# Patient Record
Sex: MALE | Race: WHITE | Employment: FULL TIME | URBAN - METROPOLITAN AREA
[De-identification: names, ages, dates, MRNs, and addresses within clinical notes are randomized per-mention and may not be internally consistent; named-entity substitution may affect disease eponyms.]

---

## 2017-10-26 LAB
CREATININE, EXTERNAL: 1.08
LDL-C, EXTERNAL: 75

## 2018-03-20 ENCOUNTER — OFFICE VISIT (OUTPATIENT)
Dept: INTERNAL MEDICINE CLINIC | Age: 48
End: 2018-03-20

## 2018-03-20 VITALS
DIASTOLIC BLOOD PRESSURE: 88 MMHG | WEIGHT: 186 LBS | OXYGEN SATURATION: 96 % | SYSTOLIC BLOOD PRESSURE: 116 MMHG | HEIGHT: 68 IN | RESPIRATION RATE: 12 BRPM | HEART RATE: 62 BPM | TEMPERATURE: 97.6 F | BODY MASS INDEX: 28.19 KG/M2

## 2018-03-20 DIAGNOSIS — E66.3 OVERWEIGHT (BMI 25.0-29.9): ICD-10-CM

## 2018-03-20 DIAGNOSIS — F51.01 PRIMARY INSOMNIA: ICD-10-CM

## 2018-03-20 DIAGNOSIS — G89.29 HEEL PAIN, CHRONIC, LEFT: ICD-10-CM

## 2018-03-20 DIAGNOSIS — M79.672 HEEL PAIN, CHRONIC, LEFT: ICD-10-CM

## 2018-03-20 DIAGNOSIS — R03.0 ELEVATED BP WITHOUT DIAGNOSIS OF HYPERTENSION: Primary | ICD-10-CM

## 2018-03-20 PROBLEM — Z91.09 ENVIRONMENTAL ALLERGIES: Status: ACTIVE | Noted: 2018-03-20

## 2018-03-20 RX ORDER — ZOLPIDEM TARTRATE 10 MG/1
5 TABLET ORAL
COMMUNITY

## 2018-03-20 RX ORDER — BISMUTH SUBSALICYLATE 262 MG
1 TABLET,CHEWABLE ORAL DAILY
COMMUNITY

## 2018-03-20 RX ORDER — IBUPROFEN 200 MG
CAPSULE ORAL AS NEEDED
COMMUNITY
End: 2018-03-20

## 2018-03-20 NOTE — PROGRESS NOTES
Deondre Dunbar is a 52y.o. year old male who is a new patient to me today. He was previous followed by Dr He Shipman at [facility]. He works at [employer] for [place]. He RTC with wife. He brought in previous PCP records for review. Assessment & Plan:   Diagnoses and all orders for this visit:    1. Elevated BP without diagnosis of hypertension- new dx, reviewed possible etiologies of elevated BP, will continue to monitor at home and up date me in 6-8 wks. If remains borderline did review starting low dose medication    2. Primary insomnia- new dx to me, chronic problem, using meds infrequently, reviewed concerns about long term use,  not reviewed, no changes     3. Heel pain, chronic, left- this is a chronic problem but new to me, symptoms are: not changed, differential dx reviewed with the patient, sounds more like a heel issue (spur or stress fx) more then plantar fasciitis. Will start w/ xray. Did review seeing podiatry. Red flags were reviewed with the patient to RTC or notify me, expected time course for resolution reviewed. -     XR CALCANEUS LT; Future    4. Overweight (BMI 25.0-29. 9)- new dx to me, slightly up from his baseline per him, I have reviewed/discussed the above normal BMI with the patient. I have recommended the following interventions: lifestyle education regarding diet. Follow-up Disposition:  Return in about 6 months (around 9/20/2018) for FULL PHYSICAL - 30 minutes. Subjective:     Fide Baca was seen today for Establish Care    Neurological Review  He is here today to talk about headaches. This is a chronic problem. Course to date has been improving. Previously he attributed to using a Verve green discs. Now will get intermittent and attributed to diet. He reports when he did have h/a it was more global and throbbing. His BP had been on/off.     Orthopedic Review  He presents do to pain of his left foot that is secondary to no known injury and started at least 9 month.  Since it started his pain has not changed. He describes the pain as aching, sharp. It is constant but fluctuating in intensity. He denies weakness, denies numbness, denies paresthesias. Exacerbating factors identifiable by patient are walking, running. He has tried the following: insoles. These have been: somewhat effective. Previous workup: none. The following sections were reviewed & updated as appropriate: PMH, PL, PSH, FH, and SH. Patient Active Problem List   Diagnosis Code    Primary insomnia F51.01    Environmental allergies Z91.09          Prior to Admission medications    Medication Sig Start Date End Date Taking? Authorizing Provider   FLUTICASONE PROPIONATE (FLONASE NA) 2 Sprays by Nasal route daily as needed. Yes Historical Provider   zolpidem (AMBIEN) 10 mg tablet Take 5 mg by mouth nightly as needed for Sleep. Yes Historical Provider   multivitamin (ONE A DAY) tablet Take 1 Tab by mouth daily. Yes Historical Provider          No Known Allergies         Review of Systems   Constitutional: Negative for malaise/fatigue and weight loss. Respiratory: Negative for cough and shortness of breath. Cardiovascular: Negative for chest pain, palpitations and leg swelling. Gastrointestinal: Negative for abdominal pain, constipation, diarrhea, heartburn, nausea and vomiting. Genitourinary: Negative for frequency. Musculoskeletal: Positive for joint pain. Negative for myalgias. Skin: Negative for rash. Neurological: Positive for headaches. Negative for tingling, sensory change and focal weakness. Psychiatric/Behavioral: Negative for depression. The patient is not nervous/anxious and does not have insomnia. Objective:   Physical Exam   Constitutional: No distress. HENT:   Mouth/Throat: Mucous membranes are normal.   Eyes: Conjunctivae are normal. No scleral icterus. Neck: Neck supple. Cardiovascular: Regular rhythm and normal heart sounds.     No murmur heard. Pulmonary/Chest: Effort normal and breath sounds normal. He has no wheezes. He has no rales. Abdominal: Soft. Bowel sounds are normal. He exhibits no mass. There is no hepatosplenomegaly. There is no tenderness. Musculoskeletal: He exhibits no edema. Left foot: There is no tenderness, no bony tenderness, no deformity and no laceration. Lymphadenopathy:     He has no cervical adenopathy. Skin: No rash noted. Psychiatric: He has a normal mood and affect. His behavior is normal.          Visit Vitals    /88    Pulse 62    Temp 97.6 °F (36.4 °C) (Oral)    Resp 12    Ht 5' 8.14\" (1.731 m)    Wt 186 lb (84.4 kg)    SpO2 96%    BMI 28.17 kg/m2         Disclaimer:  Advised him to call back or return to office if symptoms worsen/change/persist.  Discussed expected course/resolution/complications of diagnosis in detail with patient. Medication risks/benefits/costs/interactions/alternatives discussed with patient. He was given an after visit summary which includes diagnoses, current medications, & vitals. He expressed understanding with the diagnosis and plan. Aspects of this note may have been generated using voice recognition software. Despite editing, there may be some syntax errors.        Piotr Spaulding MD

## 2018-03-20 NOTE — PROGRESS NOTES
To establish care. Last saw Dr. Jessica Lucas at [place] in October. BP was elevated at that time.   Had tetanus shot at [facility].

## 2018-03-20 NOTE — PATIENT INSTRUCTIONS
Checking your blood pressure at home: Your blood pressure was either borderline or to high. Please check your blood pressure 2-3 times per week. If the top number stays higher then 140 or the bottom number higher then 90 please let me know. Office number: [phone] or you can use [employer]. Learning About High Blood Pressure  What is high blood pressure? Blood pressure is a measure of how hard the blood pushes against the walls of your arteries. It's normal for blood pressure to go up and down throughout the day, but if it stays up, you have high blood pressure. Another name for high blood pressure is hypertension. Two numbers tell you your blood pressure. The first number is the systolic pressure. It shows how hard the blood pushes when your heart is pumping. The second number is the diastolic pressure. It shows how hard the blood pushes between heartbeats, when your heart is relaxed and filling with blood. A blood pressure of less than 120/80 (say \"120 over 80\") is ideal for an adult. High blood pressure is 140/90 or higher. You have high blood pressure if your top number is 140 or higher or your bottom number is 90 or higher, or both. Many people fall into the category in between, called prehypertension. People with prehypertension need to make lifestyle changes to bring their blood pressure down and help prevent or delay high blood pressure. What happens when you have high blood pressure? · Blood flows through your arteries with too much force. Over time, this damages the walls of your arteries. But you can't feel it. High blood pressure usually doesn't cause symptoms. · Fat and calcium start to build up in your arteries. This buildup is called plaque. Plaque makes your arteries narrower and stiffer. Blood can't flow through them as easily. · This lack of good blood flow starts to damage some of the organs in your body.  This can lead to problems such as coronary artery disease and heart attack, heart failure, stroke, kidney failure, and eye damage. How can you prevent high blood pressure? · Stay at a healthy weight. · Try to limit how much sodium you eat to less than 2,300 milligrams (mg) a day. If you limit your sodium to 1,500 mg a day, you can lower your blood pressure even more. ¨ Buy foods that are labeled \"unsalted,\" \"sodium-free,\" or \"low-sodium. \" Foods labeled \"reduced-sodium\" and \"light sodium\" may still have too much sodium. ¨ Flavor your food with garlic, lemon juice, onion, vinegar, herbs, and spices instead of salt. Do not use soy sauce, steak sauce, onion salt, garlic salt, mustard, or ketchup on your food. ¨ Use less salt (or none) when recipes call for it. You can often use half the salt a recipe calls for without losing flavor. · Be physically active. Get at least 30 minutes of exercise on most days of the week. Walking is a good choice. You also may want to do other activities, such as running, swimming, cycling, or playing tennis or team sports. · Limit alcohol to 2 drinks a day for men and 1 drink a day for women. · Eat plenty of fruits, vegetables, and low-fat dairy products. Eat less saturated and total fats. How is high blood pressure treated? · Your doctor will suggest making lifestyle changes. For example, your doctor may ask you to eat healthy foods, quit smoking, lose extra weight, and be more active. · If lifestyle changes don't help enough or your blood pressure is very high, you will have to take medicine every day. Follow-up care is a key part of your treatment and safety. Be sure to make and go to all appointments, and call your doctor if you are having problems. It's also a good idea to know your test results and keep a list of the medicines you take. Where can you learn more? Go to http://sigrid-cristin.info/. Enter P501 in the search box to learn more about \"Learning About High Blood Pressure. \"  Current as of: September 21, 2016  Content Version: 11.4  © 3478-6316 [employer]wise, Inc[employer]. Care instructions adapted under license by [employer] (which disclaims liability or warranty for this information). If you have questions about a medical condition or this instruction, always ask your healthcare professional. [address] any warranty or liability for your use of this information.

## 2018-03-21 ENCOUNTER — HOSPITAL ENCOUNTER (OUTPATIENT)
Dept: GENERAL RADIOLOGY | Age: 48
Discharge: HOME | End: 2018-03-21
Payer: COMMERCIAL

## 2018-03-21 DIAGNOSIS — G89.29 HEEL PAIN, CHRONIC, LEFT: ICD-10-CM

## 2018-03-21 DIAGNOSIS — M79.672 HEEL PAIN, CHRONIC, LEFT: ICD-10-CM

## 2018-03-21 PROCEDURE — 73650 X-RAY EXAM OF HEEL: CPT

## 2019-04-08 NOTE — MR AVS SNAPSHOT
[address] [address] 
[phone] Patient: Isis Schultz MRN: [id number] Visit Information Date & Time Provider Department Dept. Phone Encounter #  
 3/20/2018  8:30 AM Britt Yates, [address] [place] [address] Internal Medicine [phone] [id number] Follow-up Instructions Return in about 6 months (around 9/20/2018) for FULL PHYSICAL - 30 minutes. Upcoming Health Maintenance Date Due DTaP/Tdap/Td series (1 - Tdap) 11/15/1991 Allergies as of 3/20/2018  Review Complete On: 3/20/2018 By: Britt Yates MD  
 No Known Allergies Current Immunizations  Reviewed on 3/20/2018 No immunizations on file. Reviewed by Jaja Newby RN on 3/20/2018 at  8:44 AM  
You Were Diagnosed With   
  
 Codes Comments Elevated BP without diagnosis of hypertension    -  Primary ICD-10-CM: R03.0 ICD-9-CM: 796.2 Primary insomnia     ICD-10-CM: F51.01 
ICD-9-CM: 307.42 Heel pain, chronic, left     ICD-10-CM: M79.672, G89.29 ICD-9-CM: 729.5, 338.29 Vitals BP Pulse Temp Resp Height(growth percentile) Weight(growth percentile) 116/88 62 97.6 °F (36.4 °C) (Oral) 12 5' 8.14\" (1.731 m) 186 lb (84.4 kg) SpO2 BMI Smoking Status 96% 28.17 kg/m2 Never Smoker BMI and BSA Data Body Mass Index Body Surface Area  
 28.17 kg/m 2 2.01 m 2 Preferred Pharmacy Pharmacy Name Phone [address], [facility] [address] [phone] Your Updated Medication List  
  
   
This list is accurate as of 3/20/18  9:23 AM.  Always use your most recent med list.  
  
  
  
  
 AMBIEN 10 mg tablet Generic drug:  zolpidem Take 5 mg by mouth nightly as needed for Sleep. FLONASE NA  
2 Sprays by Nasal route daily as needed. multivitamin tablet Commonly known as:  ONE A DAY  
 Take 1 Tab by mouth daily. Follow-up Instructions Return in about 6 months (around 9/20/2018) for FULL PHYSICAL - 30 minutes. To-Do List   
 03/20/2018 Imaging:  XR CALCANEUS LT Patient Instructions Checking your blood pressure at home: Your blood pressure was either borderline or to high. Please check your blood pressure 2-3 times per week. If the top number stays higher then 140 or the bottom number higher then 90 please let me know. Office number: [phone] or you can use [employer]. Learning About High Blood Pressure What is high blood pressure? Blood pressure is a measure of how hard the blood pushes against the walls of your arteries. It's normal for blood pressure to go up and down throughout the day, but if it stays up, you have high blood pressure. Another name for high blood pressure is hypertension. Two numbers tell you your blood pressure. The first number is the systolic pressure. It shows how hard the blood pushes when your heart is pumping. The second number is the diastolic pressure. It shows how hard the blood pushes between heartbeats, when your heart is relaxed and filling with blood. A blood pressure of less than 120/80 (say \"120 over 80\") is ideal for an adult. High blood pressure is 140/90 or higher. You have high blood pressure if your top number is 140 or higher or your bottom number is 90 or higher, or both. Many people fall into the category in between, called prehypertension. People with prehypertension need to make lifestyle changes to bring their blood pressure down and help prevent or delay high blood pressure. What happens when you have high blood pressure? · Blood flows through your arteries with too much force. Over time, this damages the walls of your arteries. But you can't feel it. High blood pressure usually doesn't cause symptoms. · Fat and calcium start to build up in your arteries.  This buildup is called plaque. Plaque makes your arteries narrower and stiffer. Blood can't flow through them as easily. · This lack of good blood flow starts to damage some of the organs in your body. This can lead to problems such as coronary artery disease and heart attack, heart failure, stroke, kidney failure, and eye damage. How can you prevent high blood pressure? · Stay at a healthy weight. · Try to limit how much sodium you eat to less than 2,300 milligrams (mg) a day. If you limit your sodium to 1,500 mg a day, you can lower your blood pressure even more. ¨ Buy foods that are labeled \"unsalted,\" \"sodium-free,\" or \"low-sodium. \" Foods labeled \"reduced-sodium\" and \"light sodium\" may still have too much sodium. ¨ Flavor your food with garlic, lemon juice, onion, vinegar, herbs, and spices instead of salt. Do not use soy sauce, steak sauce, onion salt, garlic salt, mustard, or ketchup on your food. ¨ Use less salt (or none) when recipes call for it. You can often use half the salt a recipe calls for without losing flavor. · Be physically active. Get at least 30 minutes of exercise on most days of the week. Walking is a good choice. You also may want to do other activities, such as running, swimming, cycling, or playing tennis or team sports. · Limit alcohol to 2 drinks a day for men and 1 drink a day for women. · Eat plenty of fruits, vegetables, and low-fat dairy products. Eat less saturated and total fats. How is high blood pressure treated? · Your doctor will suggest making lifestyle changes. For example, your doctor may ask you to eat healthy foods, quit smoking, lose extra weight, and be more active. · If lifestyle changes don't help enough or your blood pressure is very high, you will have to take medicine every day. Follow-up care is a key part of your treatment and safety.  Be sure to make and go to all appointments, and call your doctor if you are having problems. It's also a good idea to know your test results and keep a list of the medicines you take. Where can you learn more? Go to http://sigrid-cristin.info/. Enter P501 in the search box to learn more about \"Learning About High Blood Pressure. \" Current as of: September 21, 2016 Content Version: 11.4 © 9130-0068 [employer]. Care instructions adapted under license by [employer] (which disclaims liability or warranty for this information). If you have questions about a medical condition or this instruction, always ask your healthcare professional. [address] any warranty or liability for your use of this information. Introducing [facility]! Lauren Arias introduces "[employer]" patient portal. Now you can access parts of your medical record, email your doctor's office, and request medication refills online. 1. In your internet browser, go to [email or web address] 2. Click on the First Time User? Click Here link in the Sign In box. You will see the New Member Sign Up page. 3. Enter your "[employer]" Access Code exactly as it appears below. You will not need to use this code after youve completed the sign-up process. If you do not sign up before the expiration date, you must request a new code. · "[employer]" Access Code: [email or web address] Expires: 6/18/2018  8:11 AM 
 
4. Enter the last four digits of your Social Security Number (xxxx) and Date of Birth (mm/dd/yyyy) as indicated and click Submit. You will be taken to the next sign-up page. 5. Create a [employer] ID. This will be your "[employer]" login ID and cannot be changed, so think of one that is secure and easy to remember. 6. Create a "[employer]" password. You can change your password at any time. 7. Enter your Password Reset Question and Answer. This can be used at a later time if you forget your password. 8. Enter your e-mail address. You will receive e-mail notification when new information is available in [address] 9. Click Sign Up. You can now view and download portions of your medical record. 10. Click the Download Summary menu link to download a portable copy of your medical information. If you have questions, please visit the Frequently Asked Questions section of the [employer] website. Remember, [employer] is NOT to be used for urgent needs. For medical emergencies, dial 911. Now available from your iPhone and Android! Please provide this summary of care documentation to your next provider. Your primary care clinician is listed as Luanne Olvera. If you have any questions after today's visit, please call [phone]. 15

## 2022-02-20 ENCOUNTER — HOSPITAL ENCOUNTER (EMERGENCY)
Age: 52
Discharge: HOME | End: 2022-02-20
Attending: EMERGENCY MEDICINE
Payer: COMMERCIAL

## 2022-02-20 ENCOUNTER — APPOINTMENT (OUTPATIENT)
Dept: GENERAL RADIOLOGY | Age: 52
End: 2022-02-20
Attending: EMERGENCY MEDICINE
Payer: COMMERCIAL

## 2022-02-20 VITALS
DIASTOLIC BLOOD PRESSURE: 84 MMHG | TEMPERATURE: 98.3 F | SYSTOLIC BLOOD PRESSURE: 142 MMHG | RESPIRATION RATE: 16 BRPM | HEART RATE: 67 BPM | OXYGEN SATURATION: 99 %

## 2022-02-20 DIAGNOSIS — R07.9 CHEST PAIN, UNSPECIFIED TYPE: Primary | ICD-10-CM

## 2022-02-20 LAB
ALBUMIN SERPL-MCNC: 3.7 G/DL (ref 3.5–5)
ALBUMIN/GLOB SERPL: 1.2 {RATIO} (ref 1.1–2.2)
ALP SERPL-CCNC: 60 U/L (ref 45–117)
ALT SERPL-CCNC: 68 U/L (ref 12–78)
ANION GAP SERPL CALC-SCNC: 5 MMOL/L (ref 5–15)
AST SERPL-CCNC: 48 U/L (ref 15–37)
BASOPHILS # BLD: 0 K/UL (ref 0–0.1)
BASOPHILS NFR BLD: 1 % (ref 0–1)
BILIRUB SERPL-MCNC: 1.1 MG/DL (ref 0.2–1)
BUN SERPL-MCNC: 19 MG/DL (ref 6–20)
BUN/CREAT SERPL: 17 (ref 12–20)
CALCIUM SERPL-MCNC: 9.2 MG/DL (ref 8.5–10.1)
CHLORIDE SERPL-SCNC: 107 MMOL/L (ref 97–108)
CO2 SERPL-SCNC: 28 MMOL/L (ref 21–32)
COMMENT, HOLDF: NORMAL
CREAT SERPL-MCNC: 1.09 MG/DL (ref 0.7–1.3)
DIFFERENTIAL METHOD BLD: NORMAL
EOSINOPHIL # BLD: 0.2 K/UL (ref 0–0.4)
EOSINOPHIL NFR BLD: 4 % (ref 0–7)
ERYTHROCYTE [DISTWIDTH] IN BLOOD BY AUTOMATED COUNT: 11.9 % (ref 11.5–14.5)
GLOBULIN SER CALC-MCNC: 3 G/DL (ref 2–4)
GLUCOSE SERPL-MCNC: 111 MG/DL (ref 65–100)
HCT VFR BLD AUTO: 45.6 % (ref 36.6–50.3)
HGB BLD-MCNC: 16 G/DL (ref 12.1–17)
IMM GRANULOCYTES # BLD AUTO: 0 K/UL (ref 0–0.04)
IMM GRANULOCYTES NFR BLD AUTO: 0 % (ref 0–0.5)
LYMPHOCYTES # BLD: 1.9 K/UL (ref 0.8–3.5)
LYMPHOCYTES NFR BLD: 35 % (ref 12–49)
MCH RBC QN AUTO: 33.3 PG (ref 26–34)
MCHC RBC AUTO-ENTMCNC: 35.1 G/DL (ref 30–36.5)
MCV RBC AUTO: 95 FL (ref 80–99)
MONOCYTES # BLD: 0.3 K/UL (ref 0–1)
MONOCYTES NFR BLD: 6 % (ref 5–13)
NEUTS SEG # BLD: 2.9 K/UL (ref 1.8–8)
NEUTS SEG NFR BLD: 54 % (ref 32–75)
NRBC # BLD: 0 K/UL (ref 0–0.01)
NRBC BLD-RTO: 0 PER 100 WBC
PLATELET # BLD AUTO: 177 K/UL (ref 150–400)
PMV BLD AUTO: 9.2 FL (ref 8.9–12.9)
POTASSIUM SERPL-SCNC: 3.8 MMOL/L (ref 3.5–5.1)
PROT SERPL-MCNC: 6.7 G/DL (ref 6.4–8.2)
RBC # BLD AUTO: 4.8 M/UL (ref 4.1–5.7)
SAMPLES BEING HELD,HOLD: NORMAL
SODIUM SERPL-SCNC: 140 MMOL/L (ref 136–145)
TROPONIN-HIGH SENSITIVITY: 36 NG/L (ref 0–76)
TROPONIN-HIGH SENSITIVITY: 36 NG/L (ref 0–76)
WBC # BLD AUTO: 5.4 K/UL (ref 4.1–11.1)

## 2022-02-20 PROCEDURE — 99283 EMERGENCY DEPT VISIT LOW MDM: CPT

## 2022-02-20 PROCEDURE — 84484 ASSAY OF TROPONIN QUANT: CPT

## 2022-02-20 PROCEDURE — 80053 COMPREHEN METABOLIC PANEL: CPT

## 2022-02-20 PROCEDURE — 93005 ELECTROCARDIOGRAM TRACING: CPT

## 2022-02-20 PROCEDURE — 85025 COMPLETE CBC W/AUTO DIFF WBC: CPT

## 2022-02-20 PROCEDURE — 71046 X-RAY EXAM CHEST 2 VIEWS: CPT

## 2022-02-20 PROCEDURE — 36415 COLL VENOUS BLD VENIPUNCTURE: CPT

## 2022-02-20 NOTE — ED TRIAGE NOTES
Triage: Pt arrives ambulatory from home with CC of chest pain since yesterday. The pain has been intermittent. He denies current SOB but when the pain is happening he does have SOB. Pain is non radiating. Denies N/V. Denies fever/cough.

## 2022-02-21 LAB
ATRIAL RATE: 59 BPM
CALCULATED P AXIS, ECG09: 57 DEGREES
CALCULATED R AXIS, ECG10: 66 DEGREES
CALCULATED T AXIS, ECG11: 41 DEGREES
DIAGNOSIS, 93000: NORMAL
P-R INTERVAL, ECG05: 176 MS
Q-T INTERVAL, ECG07: 414 MS
QRS DURATION, ECG06: 94 MS
QTC CALCULATION (BEZET), ECG08: 409 MS
VENTRICULAR RATE, ECG03: 59 BPM

## 2022-02-21 NOTE — ED PROVIDER NOTES
The history is provided by the patient. Chest Pain (Angina)   This is a new problem. The current episode started 2 days ago. The problem has not changed since onset. Episode frequency: intermittent. The pain is at a severity of 7/10. The quality of the pain is described as sharp and intermittent. The pain does not radiate. Associated symptoms include shortness of breath (when in pain). Pertinent negatives include no cough, no diaphoresis, no fever, no irregular heartbeat, no near-syncope and no weakness. He has tried nothing for the symptoms. The treatment provided no relief. No risk factors for CADPertinent negatives include no cardiac catheterization and no echocardiogram.       Past Medical History:   Diagnosis Date    Environmental allergies 3/20/2018    Primary insomnia 3/20/2018       Past Surgical History:   Procedure Laterality Date    HX COLONOSCOPY N/A 12/04/2015    diverticulosis; int hemorrhoids; o/w normal; repeat 5 years    HX KNEE ARTHROSCOPY  2003    left ? ?    HX OTHER SURGICAL  2005    excision of blood vessels right side abdomen         Family History:   Problem Relation Age of Onset    Cancer Mother         colon and tumor near breast    Cancer Father         lung and colon    No Known Problems Brother     No Known Problems Son     No Known Problems Son        Social History     Socioeconomic History    Marital status:      Spouse name: Not on file    Number of children: Not on file    Years of education: Not on file    Highest education level: Not on file   Occupational History    Not on file   Tobacco Use    Smoking status: Never Smoker    Smokeless tobacco: Former User    Tobacco comment: [employer]/ smokeless discs-quit 4 months ago   Substance and Sexual Activity    Alcohol use:  Yes     Alcohol/week: 2.0 standard drinks     Types: 2 Standard drinks or equivalent per week    Drug use: No    Sexual activity: Yes     Partners: Female     Birth control/protection: Condom   Other Topics Concern    Not on file   Social History Narrative    Not on file     Social Determinants of Health     Financial Resource Strain:     Difficulty of Paying Living Expenses: Not on file   Food Insecurity:     Worried About Running Out of Food in the Last Year: Not on file    Edy of Food in the Last Year: Not on file   Transportation Needs:     Lack of Transportation (Medical): Not on file    Lack of Transportation (Non-Medical): Not on file   Physical Activity:     Days of Exercise per Week: Not on file    Minutes of Exercise per Session: Not on file   Stress:     Feeling of Stress : Not on file   Social Connections:     Frequency of Communication with Friends and Family: Not on file    Frequency of Social Gatherings with Friends and Family: Not on file    Attends [employer] Services: Not on file    Active Member of [address] [place] [address] [employer] or Organizations: Not on file    Attends Club or Organization Meetings: Not on file    Marital Status: Not on file   Intimate Partner Violence:     Fear of Current or Ex-Partner: Not on file    Emotionally Abused: Not on file    Physically Abused: Not on file    Sexually Abused: Not on file   Housing Stability:     Unable to Pay for Housing in the Last Year: Not on file    Number of [place] in the Last Year: Not on file    Unstable Housing in the Last Year: Not on file         ALLERGIES: Patient has no known allergies. Review of Systems   Constitutional: Negative for diaphoresis and fever. Respiratory: Positive for shortness of breath (when in pain). Negative for cough. Cardiovascular: Positive for chest pain. Negative for leg swelling and near-syncope. Neurological: Negative for weakness. All other systems reviewed and are negative. Vitals:    02/20/22 1714   BP: (!) 142/84   Pulse: 67   Resp: 16   Temp: 98.3 °F (36.8 °C)   SpO2: 99%            Physical Exam  Vitals and nursing note reviewed.    Constitutional: General: He is not in acute distress. Appearance: He is well-developed. HENT:      Head: Normocephalic and atraumatic. Eyes:      Conjunctiva/sclera: Conjunctivae normal.      Pupils: Pupils are equal, round, and reactive to light. Cardiovascular:      Rate and Rhythm: Normal rate and regular rhythm. Pulmonary:      Effort: Pulmonary effort is normal.      Breath sounds: Normal breath sounds. Abdominal:      General: There is no distension. Palpations: Abdomen is soft. Tenderness: There is no abdominal tenderness. Musculoskeletal:         General: Normal range of motion. Cervical back: Normal range of motion. Right lower leg: No edema. Left lower leg: No edema. Skin:     General: Skin is dry. Capillary Refill: Capillary refill takes less than 2 seconds. Neurological:      General: No focal deficit present. Mental Status: He is alert and oriented to person, place, and time. Psychiatric:         Mood and Affect: Mood normal.         Behavior: Behavior normal.          MDM         Procedures      The patient is resting comfortably and feels better, is alert and in no distress. The repeat examination is unremarkable and benign. The electrocardiogram shows no signs of acute ischemia and the history, exam, diagnostic testing and current condition do not suggest that this patient is having an acute myocardial infarction, significant arrhythmia, unstable angina, esophageal perforation, pulmonary embolism, aortic dissection, pneumothorax, severe pneumonia, sepsis or other significant pathology that would warrant further testing, continued ED treatment, admission, or cardiology or other specialist consultation at this point. His symptoms are not triggered by or worsened by exertion and they are not improved with rest. The vital signs have been stable. The patient's condition is stable and appropriate for discharge.  The patient will pursue further outpatient evaluation with the primary care physician, other designated physician or cardiologist. The patient and/or caregivers have expressed a clear and thorough understanding and agree to follow up as instructed. I discussed with the patient and/or caregiver the rapid rule-out protocol for acute coronary syndrome and myocardial infarction and that, given the findings during this ED evaluation, there is no clinical, ECG or laboratory evidence of injury to the heart. I further explained that there is no currently validated protocol that can reliably risk stratify a patient into the very low risk category of less than a 1-2% possibility of significant cardiac disease. Therefore it remains possible that there is underlying pathology that may develop into an acute coronary syndrome at some time in the future. I discussed this with the patient and/or caregivers at length, as well as the necessary steps that may include follow-up, stress testing, cardiac imaging and further lab evaluation for further assessment. The patient and/or caregivers have expressed a clear and thorough understanding and agree to follow up as instructed.

## 2022-03-19 PROBLEM — F51.01 PRIMARY INSOMNIA: Status: ACTIVE | Noted: 2018-03-20

## 2022-03-19 PROBLEM — Z91.09 ENVIRONMENTAL ALLERGIES: Status: ACTIVE | Noted: 2018-03-20

## 2023-05-11 RX ORDER — ZOLPIDEM TARTRATE 10 MG/1
5 TABLET ORAL NIGHTLY PRN
COMMUNITY

## 2024-02-27 ENCOUNTER — HOSPITAL ENCOUNTER (OUTPATIENT)
Facility: HOSPITAL | Age: 54
Discharge: HOME | End: 2024-03-01
Payer: COMMERCIAL

## 2024-02-27 DIAGNOSIS — M54.16 LUMBAR RADICULOPATHY: ICD-10-CM

## 2024-02-27 DIAGNOSIS — M51.26 PROTRUSION OF LUMBAR INTERVERTEBRAL DISC: ICD-10-CM

## 2024-02-27 PROCEDURE — 72148 MRI LUMBAR SPINE W/O DYE: CPT

## 2024-03-21 ENCOUNTER — TRANSCRIBE ORDERS (OUTPATIENT)
Facility: HOSPITAL | Age: 54
End: 2024-03-21

## 2024-03-21 DIAGNOSIS — I72.3 ANEURYSM ARTERY, ILIAC (HCC): Primary | ICD-10-CM

## 2024-11-29 ENCOUNTER — OFFICE VISIT (OUTPATIENT)
Age: 54
End: 2024-11-29

## 2024-11-29 VITALS
OXYGEN SATURATION: 95 % | RESPIRATION RATE: 18 BRPM | SYSTOLIC BLOOD PRESSURE: 121 MMHG | TEMPERATURE: 97.9 F | HEART RATE: 72 BPM | BODY MASS INDEX: 26.73 KG/M2 | WEIGHT: 181 LBS | DIASTOLIC BLOOD PRESSURE: 85 MMHG

## 2024-11-29 DIAGNOSIS — J02.9 SORE THROAT: ICD-10-CM

## 2024-11-29 DIAGNOSIS — H66.90 ACUTE OTITIS MEDIA, UNSPECIFIED OTITIS MEDIA TYPE: Primary | ICD-10-CM

## 2024-11-29 DIAGNOSIS — H61.22 IMPACTED CERUMEN OF LEFT EAR: ICD-10-CM

## 2024-11-29 LAB — S PYO AG THROAT QL: NORMAL

## 2024-11-29 RX ORDER — TRAZODONE HYDROCHLORIDE 50 MG/1
50 TABLET, FILM COATED ORAL NIGHTLY
COMMUNITY
Start: 2024-10-31

## 2024-11-29 NOTE — PROGRESS NOTES
2024   Wu Bautista (: 1970) is a 54 y.o. male, New patient, here for evaluation of the following chief complaint(s):  Ear Pain (Pt presents with pain/pressure in L ear)     ASSESSMENT/PLAN:  Below is the assessment and plan developed based on review of pertinent history, physical exam, labs, studies, and medications.  1. Acute otitis media, unspecified otitis media type  -     amoxicillin-clavulanate (AUGMENTIN) 875-125 MG per tablet; Take 1 tablet by mouth 2 times daily for 10 days, Disp-20 tablet, R-0Normal  2. Sore throat  -     POCT rapid strep A  3. Impacted cerumen of left ear  -     EAR CERUMEN REMOVAL       Left TM was unable to visualized due to cerumen impaction. After cerumen was removed from left ear via irrigation, TM was noted to be erythematous and bulging, ear canal normal. Ordered augmention twice daily for 10 days. Advised to take med with food.  Handout given with care instructions for cerumen impaction and otitis media. Suspect that sore throat is related to report of recent nasal congestion.   2. OTC for symptom management. Increase fluid intake, ensure adequate nutritional intake.  3. Follow up with PCP as needed.  4. Go to ED with development of any acute symptoms.     Follow up:    Follow up immediately for any new, worsening or changes or if symptoms are not improving over the next 5-7 days.     SUBJECTIVE/OBJECTIVE:  Patient report that he started having some pain and pressure in left ear since yesterday morning. Reports that he started to get a headache, fatigue, and congestion late yesterday afternoon. Denies any fever, nausea, vomiting or diarrhea. Admits to sore throat and reports hx of strep throat. Requesting to be checked for strep today. Also reports that he has had to have irrigation for ear wax removal in the past.           Ear Pain (Pt presents with pain/pressure in L ear)      Results for orders placed or performed in visit on 24   POCT rapid strep A

## 2025-04-11 ENCOUNTER — OFFICE VISIT (OUTPATIENT)
Age: 55
End: 2025-04-11

## 2025-04-11 VITALS
TEMPERATURE: 97.9 F | BODY MASS INDEX: 26.02 KG/M2 | WEIGHT: 176.2 LBS | DIASTOLIC BLOOD PRESSURE: 79 MMHG | RESPIRATION RATE: 18 BRPM | SYSTOLIC BLOOD PRESSURE: 119 MMHG | HEART RATE: 65 BPM | OXYGEN SATURATION: 97 %

## 2025-04-11 DIAGNOSIS — H69.93 DYSFUNCTION OF BOTH EUSTACHIAN TUBES: Primary | ICD-10-CM

## 2025-04-11 RX ORDER — FLUTICASONE PROPIONATE 50 MCG
2 SPRAY, SUSPENSION (ML) NASAL DAILY PRN
COMMUNITY

## 2025-04-11 RX ORDER — MULTIVITAMIN
1 TABLET ORAL DAILY
COMMUNITY

## 2025-04-11 RX ORDER — IBUPROFEN 400 MG/1
400 TABLET, FILM COATED ORAL
COMMUNITY
Start: 2024-06-07

## 2025-04-11 ASSESSMENT — ENCOUNTER SYMPTOMS
SORE THROAT: 0
SHORTNESS OF BREATH: 0
COUGH: 0
NAUSEA: 0
DIARRHEA: 0
VOMITING: 0

## 2025-04-11 NOTE — PROGRESS NOTES
Subjective     Chief Complaint   Patient presents with    Ear Pain     Left ear pain for couples days. Sinus pressure and congestion that started today.         Ear Pain   Pertinent negatives include no coughing, diarrhea, sore throat or vomiting.    54-year-old male presents for left ear pain for the past 2 to 3 days.  Feels fullness.  Some sinus pressure and congestion that started today.  Patient has been taking some ibuprofen.  No fever chills nausea vomiting diarrhea or urinary symptoms.  Patient does take Flonase for allergy    Past Medical History:   Diagnosis Date    Environmental allergies 3/20/2018    Primary insomnia 3/20/2018       Past Surgical History:   Procedure Laterality Date    COLONOSCOPY N/A 12/04/2015    diverticulosis; int hemorrhoids; o/w normal; repeat 5 years    ILIAC ARTERY STENT  06/2024    KNEE ARTHROSCOPY  2003    left ??    OTHER SURGICAL HISTORY  2005    excision of blood vessels right side abdomen       Family History   Problem Relation Age of Onset    Cancer Mother         colon and tumor near breast    No Known Problems Brother     No Known Problems Son     No Known Problems Son     Cancer Father         lung and colon       No Known Allergies    Social History     Tobacco Use    Smoking status: Never    Smokeless tobacco: Former     Quit date: 12/1/2017    Tobacco comments:     Quit smoking: [employer]/ smokeless discs-quit 4 months ago   Vaping Use    Vaping status: Never Used   Substance Use Topics    Alcohol use: Yes     Alcohol/week: 2.0 standard drinks of alcohol    Drug use: No       Vitals:    04/11/25 1611   BP: 119/79   Pulse: 65   Resp: 18   Temp: 97.9 °F (36.6 °C)   SpO2: 97%       Objective     Review of Systems   Constitutional:  Negative for chills and fever.   HENT:  Positive for congestion and ear pain. Negative for sore throat.    Respiratory:  Negative for cough and shortness of breath.    Gastrointestinal:  Negative for diarrhea, nausea and vomiting.

## 2025-04-11 NOTE — PATIENT INSTRUCTIONS
Patient will add an antihistamine such as Zyrtec Claritin or Allegra to his regime with the Flonase, recommend Mucinex D 12-hour formulation and follow-up if no improvement in 7 to 10 days